# Patient Record
Sex: FEMALE | Race: WHITE | NOT HISPANIC OR LATINO | URBAN - METROPOLITAN AREA
[De-identification: names, ages, dates, MRNs, and addresses within clinical notes are randomized per-mention and may not be internally consistent; named-entity substitution may affect disease eponyms.]

---

## 2022-09-08 ENCOUNTER — LAB OUTSIDE AN ENCOUNTER (OUTPATIENT)
Dept: URBAN - METROPOLITAN AREA CLINIC 33 | Facility: CLINIC | Age: 57
End: 2022-09-08

## 2022-09-08 ENCOUNTER — DASHBOARD ENCOUNTERS (OUTPATIENT)
Age: 57
End: 2022-09-08

## 2022-09-08 ENCOUNTER — OFFICE VISIT (OUTPATIENT)
Dept: URBAN - METROPOLITAN AREA CLINIC 33 | Facility: CLINIC | Age: 57
End: 2022-09-08
Payer: COMMERCIAL

## 2022-09-08 VITALS
HEIGHT: 69 IN | BODY MASS INDEX: 26.07 KG/M2 | WEIGHT: 176 LBS | HEART RATE: 73 BPM | DIASTOLIC BLOOD PRESSURE: 75 MMHG | SYSTOLIC BLOOD PRESSURE: 127 MMHG | OXYGEN SATURATION: 98 %

## 2022-09-08 DIAGNOSIS — Z87.11 HX OF GASTRIC ULCER: ICD-10-CM

## 2022-09-08 DIAGNOSIS — R10.31 RLQ ABDOMINAL PAIN: ICD-10-CM

## 2022-09-08 DIAGNOSIS — Z79.1 NSAID LONG-TERM USE: ICD-10-CM

## 2022-09-08 DIAGNOSIS — R10.12 LUQ ABDOMINAL PAIN: ICD-10-CM

## 2022-09-08 DIAGNOSIS — R19.4 CHANGE IN BOWEL HABITS: ICD-10-CM

## 2022-09-08 DIAGNOSIS — K52.89 (LYMPHOCYTIC) MICROSCOPIC COLITIS: ICD-10-CM

## 2022-09-08 PROCEDURE — 99244 OFF/OP CNSLTJ NEW/EST MOD 40: CPT | Performed by: INTERNAL MEDICINE

## 2022-09-08 PROCEDURE — 99204 OFFICE O/P NEW MOD 45 MIN: CPT | Performed by: INTERNAL MEDICINE

## 2022-09-08 RX ORDER — METOPROLOL TARTRATE 25 MG/1
AS DIRECTED TABLET, FILM COATED ORAL
Status: ACTIVE | COMMUNITY

## 2022-09-08 RX ORDER — SODIUM PICOSULFATE, MAGNESIUM OXIDE, AND ANHYDROUS CITRIC ACID 10; 3.5; 12 MG/160ML; G/160ML; G/160ML
160 ML LIQUID ORAL
Qty: 320 MILLILITER | Refills: 0 | OUTPATIENT

## 2022-09-08 NOTE — HPI-TODAY'S VISIT:
57 Year old female patient admits abdominal pain. She admits she is experiencing two different pains. One is located in her LUQ. This pain is constant and intense. The other pain is in her RLQ. This pain can be described as dull and achy. Pain started 4 months and went from constant to sporadic. It is now constant again however pain is less intense. Patient admits any aggravating factors: eating regardless of the food content. Patient admits any alleviating factors: resting. Patient has tried Prilosec, Gas x, Pepcid with no relief of their symptoms. She admits early satiety and loss of appetite.   Patient admits she has a history of gastric ulcers. In 2012, she was emitted to [facility] for 12 bleeding ulcers. Patient was taking significant amounts of ASA back then. Patient admits she also suffers from Migraines for which she normally treats with Zomig y Advil. She takes Advil pretty often. She believes aspirin products may be causing aggravating gastric ulcers again.   She admits she is experiencing diarrhea. Stools are loose. Currently, having 7-8 bowel movements a day. Denies the presence of blood, mucus, melena in stools.    GI MD: no heartburn but belching. No dysphagia. No CP or SOB. No wheezing. Snoring more than usual according to .  No hoarseness or cough. Regarding BM's, change in last 6 months. Used to have a NL BM 1-2 times a day.  No changes on meds prior to onset of diarrhea; has been on Prilosec 10 yrs although dose was recently increased. BM's in middle of night too. No weight loss

## 2022-10-03 ENCOUNTER — TELEPHONE ENCOUNTER (OUTPATIENT)
Dept: URBAN - METROPOLITAN AREA CLINIC 23 | Facility: CLINIC | Age: 57
End: 2022-10-03

## 2022-10-05 ENCOUNTER — OFFICE VISIT (OUTPATIENT)
Dept: URBAN - METROPOLITAN AREA SURGERY CENTER 8 | Facility: SURGERY CENTER | Age: 57
End: 2022-10-05
Payer: COMMERCIAL

## 2022-10-05 DIAGNOSIS — K63.89 BACTERIAL OVERGROWTH SYNDROME: ICD-10-CM

## 2022-10-05 DIAGNOSIS — D12.2 ADENOMA OF ASCENDING COLON: ICD-10-CM

## 2022-10-05 DIAGNOSIS — R10.12 ABDOMINAL BURNING SENSATION IN LEFT UPPER QUADRANT: ICD-10-CM

## 2022-10-05 DIAGNOSIS — K21.00 ALKALINE REFLUX ESOPHAGITIS: ICD-10-CM

## 2022-10-05 DIAGNOSIS — R19.7 ACUTE DIARRHEA: ICD-10-CM

## 2022-10-05 DIAGNOSIS — K31.89 ACQUIRED DEFORMITY OF DUODENUM: ICD-10-CM

## 2022-10-05 PROCEDURE — 45380 COLONOSCOPY AND BIOPSY: CPT | Performed by: INTERNAL MEDICINE

## 2022-10-05 PROCEDURE — 43239 EGD BIOPSY SINGLE/MULTIPLE: CPT | Performed by: INTERNAL MEDICINE

## 2022-10-05 PROCEDURE — G8907 PT DOC NO EVENTS ON DISCHARG: HCPCS | Performed by: INTERNAL MEDICINE

## 2022-10-25 ENCOUNTER — OFFICE VISIT (OUTPATIENT)
Dept: URBAN - METROPOLITAN AREA CLINIC 35 | Facility: CLINIC | Age: 57
End: 2022-10-25

## 2022-10-25 NOTE — HPI-TODAY'S VISIT:
Patient presents today for follow up to her Colonoscopy and EGD. Since the procedure patient _______ dysphagia, globus, changes in appetite, and changes in bowel habits. Impression EGD: - Z-line irregular, 39 cm from the incisors.  Biopsied. - Normal esophagus. - Normal stomach.  Biopsied. - Normal examined duodenum.  Biopsied.  Impressions Colon: - The examined portion of the ileum was normal.  Biopsied. - Diverticulosis in the distal transverse colon. - Normal mucosa in the entire examined colon.  Biopsied. - One 4 mm polyp in the distal ascending colon.  Biopsied. - Non-bleeding internal hemorrhoids